# Patient Record
Sex: FEMALE | NOT HISPANIC OR LATINO | Employment: UNEMPLOYED | URBAN - METROPOLITAN AREA
[De-identification: names, ages, dates, MRNs, and addresses within clinical notes are randomized per-mention and may not be internally consistent; named-entity substitution may affect disease eponyms.]

---

## 2018-05-21 ENCOUNTER — HOSPITAL ENCOUNTER (INPATIENT)
Facility: MEDICAL CENTER | Age: 43
LOS: 2 days | DRG: 392 | End: 2018-05-23
Attending: EMERGENCY MEDICINE | Admitting: INTERNAL MEDICINE
Payer: MEDICAID

## 2018-05-21 DIAGNOSIS — A09 INFECTIOUS COLITIS: ICD-10-CM

## 2018-05-21 PROBLEM — E87.29 HIGH ANION GAP METABOLIC ACIDOSIS: Status: ACTIVE | Noted: 2018-05-21

## 2018-05-21 PROBLEM — A41.9 SEPSIS (HCC): Status: ACTIVE | Noted: 2018-05-21

## 2018-05-21 PROBLEM — D72.829 LEUCOCYTOSIS: Status: ACTIVE | Noted: 2018-05-21

## 2018-05-21 PROBLEM — R10.9 AP (ABDOMINAL PAIN): Status: ACTIVE | Noted: 2018-05-21

## 2018-05-21 LAB
ALBUMIN SERPL BCP-MCNC: 4 G/DL (ref 3.2–4.9)
ALBUMIN/GLOB SERPL: 1.2 G/DL
ALP SERPL-CCNC: 159 U/L (ref 30–99)
ALT SERPL-CCNC: 33 U/L (ref 2–50)
ANION GAP SERPL CALC-SCNC: 12 MMOL/L (ref 0–11.9)
APPEARANCE UR: CLEAR
AST SERPL-CCNC: 23 U/L (ref 12–45)
BASOPHILS # BLD AUTO: 0.2 % (ref 0–1.8)
BASOPHILS # BLD: 0.03 K/UL (ref 0–0.12)
BILIRUB SERPL-MCNC: 0.6 MG/DL (ref 0.1–1.5)
BILIRUB UR QL STRIP.AUTO: NEGATIVE
BUN SERPL-MCNC: 10 MG/DL (ref 8–22)
CALCIUM SERPL-MCNC: 9.2 MG/DL (ref 8.5–10.5)
CHLORIDE SERPL-SCNC: 108 MMOL/L (ref 96–112)
CO2 SERPL-SCNC: 19 MMOL/L (ref 20–33)
COLOR UR: NORMAL
CREAT SERPL-MCNC: 0.57 MG/DL (ref 0.5–1.4)
EOSINOPHIL # BLD AUTO: 0 K/UL (ref 0–0.51)
EOSINOPHIL NFR BLD: 0 % (ref 0–6.9)
ERYTHROCYTE [DISTWIDTH] IN BLOOD BY AUTOMATED COUNT: 41.3 FL (ref 35.9–50)
GLOBULIN SER CALC-MCNC: 3.3 G/DL (ref 1.9–3.5)
GLUCOSE SERPL-MCNC: 133 MG/DL (ref 65–99)
GLUCOSE UR STRIP.AUTO-MCNC: NEGATIVE MG/DL
HCG UR QL: NEGATIVE
HCT VFR BLD AUTO: 43.2 % (ref 37–47)
HGB BLD-MCNC: 14.4 G/DL (ref 12–16)
IMM GRANULOCYTES # BLD AUTO: 0.02 K/UL (ref 0–0.11)
IMM GRANULOCYTES NFR BLD AUTO: 0.2 % (ref 0–0.9)
KETONES UR STRIP.AUTO-MCNC: NEGATIVE MG/DL
LACTATE BLD-SCNC: 1.8 MMOL/L (ref 0.5–2)
LEUKOCYTE ESTERASE UR QL STRIP.AUTO: NEGATIVE
LIPASE SERPL-CCNC: 9 U/L (ref 11–82)
LYMPHOCYTES # BLD AUTO: 1.03 K/UL (ref 1–4.8)
LYMPHOCYTES NFR BLD: 8 % (ref 22–41)
MCH RBC QN AUTO: 29.9 PG (ref 27–33)
MCHC RBC AUTO-ENTMCNC: 33.3 G/DL (ref 33.6–35)
MCV RBC AUTO: 89.8 FL (ref 81.4–97.8)
MICRO URNS: NORMAL
MONOCYTES # BLD AUTO: 0.14 K/UL (ref 0–0.85)
MONOCYTES NFR BLD AUTO: 1.1 % (ref 0–13.4)
NEUTROPHILS # BLD AUTO: 11.59 K/UL (ref 2–7.15)
NEUTROPHILS NFR BLD: 90.5 % (ref 44–72)
NITRITE UR QL STRIP.AUTO: NEGATIVE
NRBC # BLD AUTO: 0 K/UL
NRBC BLD-RTO: 0 /100 WBC
PH UR STRIP.AUTO: 5 [PH]
PLATELET # BLD AUTO: 331 K/UL (ref 164–446)
PMV BLD AUTO: 10.4 FL (ref 9–12.9)
POTASSIUM SERPL-SCNC: 4 MMOL/L (ref 3.6–5.5)
PROT SERPL-MCNC: 7.3 G/DL (ref 6–8.2)
PROT UR QL STRIP: NEGATIVE MG/DL
RBC # BLD AUTO: 4.81 M/UL (ref 4.2–5.4)
RBC UR QL AUTO: NEGATIVE
SODIUM SERPL-SCNC: 139 MMOL/L (ref 135–145)
SP GR UR REFRACTOMETRY: >1.045
UROBILINOGEN UR STRIP.AUTO-MCNC: 0.2 MG/DL
WBC # BLD AUTO: 12.8 K/UL (ref 4.8–10.8)

## 2018-05-21 PROCEDURE — 700101 HCHG RX REV CODE 250: Performed by: INTERNAL MEDICINE

## 2018-05-21 PROCEDURE — 99285 EMERGENCY DEPT VISIT HI MDM: CPT

## 2018-05-21 PROCEDURE — 700102 HCHG RX REV CODE 250 W/ 637 OVERRIDE(OP): Performed by: FAMILY MEDICINE

## 2018-05-21 PROCEDURE — A9270 NON-COVERED ITEM OR SERVICE: HCPCS | Performed by: INTERNAL MEDICINE

## 2018-05-21 PROCEDURE — 700111 HCHG RX REV CODE 636 W/ 250 OVERRIDE (IP): Performed by: EMERGENCY MEDICINE

## 2018-05-21 PROCEDURE — 99223 1ST HOSP IP/OBS HIGH 75: CPT | Performed by: INTERNAL MEDICINE

## 2018-05-21 PROCEDURE — 85025 COMPLETE CBC W/AUTO DIFF WBC: CPT

## 2018-05-21 PROCEDURE — 81003 URINALYSIS AUTO W/O SCOPE: CPT

## 2018-05-21 PROCEDURE — 83605 ASSAY OF LACTIC ACID: CPT

## 2018-05-21 PROCEDURE — 83690 ASSAY OF LIPASE: CPT

## 2018-05-21 PROCEDURE — 87040 BLOOD CULTURE FOR BACTERIA: CPT | Mod: 91

## 2018-05-21 PROCEDURE — 700105 HCHG RX REV CODE 258: Performed by: INTERNAL MEDICINE

## 2018-05-21 PROCEDURE — 770006 HCHG ROOM/CARE - MED/SURG/GYN SEMI*

## 2018-05-21 PROCEDURE — 700102 HCHG RX REV CODE 250 W/ 637 OVERRIDE(OP): Performed by: INTERNAL MEDICINE

## 2018-05-21 PROCEDURE — A9270 NON-COVERED ITEM OR SERVICE: HCPCS | Performed by: FAMILY MEDICINE

## 2018-05-21 PROCEDURE — 96365 THER/PROPH/DIAG IV INF INIT: CPT

## 2018-05-21 PROCEDURE — A9270 NON-COVERED ITEM OR SERVICE: HCPCS | Performed by: EMERGENCY MEDICINE

## 2018-05-21 PROCEDURE — 96375 TX/PRO/DX INJ NEW DRUG ADDON: CPT

## 2018-05-21 PROCEDURE — 700102 HCHG RX REV CODE 250 W/ 637 OVERRIDE(OP): Performed by: EMERGENCY MEDICINE

## 2018-05-21 PROCEDURE — 81025 URINE PREGNANCY TEST: CPT

## 2018-05-21 PROCEDURE — 80053 COMPREHEN METABOLIC PANEL: CPT

## 2018-05-21 RX ORDER — SODIUM CHLORIDE 9 MG/ML
500 INJECTION, SOLUTION INTRAVENOUS
Status: DISCONTINUED | OUTPATIENT
Start: 2018-05-21 | End: 2018-05-23 | Stop reason: HOSPADM

## 2018-05-21 RX ORDER — SODIUM CHLORIDE 9 MG/ML
INJECTION, SOLUTION INTRAVENOUS CONTINUOUS
Status: DISCONTINUED | OUTPATIENT
Start: 2018-05-21 | End: 2018-05-23 | Stop reason: HOSPADM

## 2018-05-21 RX ORDER — OXYCODONE HYDROCHLORIDE 10 MG/1
20 TABLET ORAL EVERY 4 HOURS PRN
Status: DISCONTINUED | OUTPATIENT
Start: 2018-05-21 | End: 2018-05-23 | Stop reason: HOSPADM

## 2018-05-21 RX ORDER — HYDROCODONE BITARTRATE AND ACETAMINOPHEN 5; 325 MG/1; MG/1
1 TABLET ORAL EVERY 8 HOURS PRN
Status: DISCONTINUED | OUTPATIENT
Start: 2018-05-21 | End: 2018-05-21

## 2018-05-21 RX ORDER — ALPRAZOLAM 0.5 MG/1
0.5 TABLET ORAL NIGHTLY PRN
Status: DISCONTINUED | OUTPATIENT
Start: 2018-05-21 | End: 2018-05-23 | Stop reason: HOSPADM

## 2018-05-21 RX ORDER — OXYCODONE HYDROCHLORIDE 5 MG/1
10 TABLET ORAL
Status: ON HOLD | COMMUNITY
End: 2018-05-21

## 2018-05-21 RX ORDER — BISACODYL 10 MG
10 SUPPOSITORY, RECTAL RECTAL
Status: DISCONTINUED | OUTPATIENT
Start: 2018-05-21 | End: 2018-05-23 | Stop reason: HOSPADM

## 2018-05-21 RX ORDER — DIPHENHYDRAMINE HCL 25 MG
25 TABLET ORAL EVERY 6 HOURS PRN
Status: DISCONTINUED | OUTPATIENT
Start: 2018-05-21 | End: 2018-05-23 | Stop reason: HOSPADM

## 2018-05-21 RX ORDER — OXYCODONE HYDROCHLORIDE 10 MG/1
10 TABLET ORAL EVERY 4 HOURS PRN
Status: DISCONTINUED | OUTPATIENT
Start: 2018-05-21 | End: 2018-05-21

## 2018-05-21 RX ORDER — SODIUM CHLORIDE 9 MG/ML
30 INJECTION, SOLUTION INTRAVENOUS
Status: DISCONTINUED | OUTPATIENT
Start: 2018-05-21 | End: 2018-05-23 | Stop reason: HOSPADM

## 2018-05-21 RX ORDER — AMOXICILLIN 250 MG
2 CAPSULE ORAL 2 TIMES DAILY
Status: DISCONTINUED | OUTPATIENT
Start: 2018-05-21 | End: 2018-05-23 | Stop reason: HOSPADM

## 2018-05-21 RX ORDER — MORPHINE SULFATE 4 MG/ML
4 INJECTION, SOLUTION INTRAMUSCULAR; INTRAVENOUS EVERY 4 HOURS PRN
Status: DISCONTINUED | OUTPATIENT
Start: 2018-05-21 | End: 2018-05-23 | Stop reason: HOSPADM

## 2018-05-21 RX ORDER — OXYCODONE HCL 20 MG/1
20 TABLET, FILM COATED, EXTENDED RELEASE ORAL EVERY 4 HOURS PRN
COMMUNITY

## 2018-05-21 RX ORDER — ACETAMINOPHEN 325 MG/1
650 TABLET ORAL EVERY 6 HOURS PRN
Status: DISCONTINUED | OUTPATIENT
Start: 2018-05-21 | End: 2018-05-23 | Stop reason: HOSPADM

## 2018-05-21 RX ORDER — CEFTRIAXONE 1 G/1
1 INJECTION, POWDER, FOR SOLUTION INTRAMUSCULAR; INTRAVENOUS ONCE
Status: COMPLETED | OUTPATIENT
Start: 2018-05-21 | End: 2018-05-21

## 2018-05-21 RX ORDER — POLYETHYLENE GLYCOL 3350 17 G/17G
1 POWDER, FOR SOLUTION ORAL
Status: DISCONTINUED | OUTPATIENT
Start: 2018-05-21 | End: 2018-05-23 | Stop reason: HOSPADM

## 2018-05-21 RX ADMIN — CEFTRIAXONE SODIUM 1 G: 1 INJECTION, POWDER, FOR SOLUTION INTRAMUSCULAR; INTRAVENOUS at 17:01

## 2018-05-21 RX ADMIN — HYDROCODONE BITARTRATE AND ACETAMINOPHEN 1 TABLET: 5; 325 TABLET ORAL at 19:37

## 2018-05-21 RX ADMIN — METRONIDAZOLE 500 MG: 500 INJECTION, SOLUTION INTRAVENOUS at 18:47

## 2018-05-21 RX ADMIN — OXYCODONE HYDROCHLORIDE 10 MG: 5 TABLET ORAL at 21:17

## 2018-05-21 RX ADMIN — LIDOCAINE HYDROCHLORIDE 30 ML: 20 SOLUTION OROPHARYNGEAL at 16:11

## 2018-05-21 RX ADMIN — SODIUM CHLORIDE: 9 INJECTION, SOLUTION INTRAVENOUS at 19:45

## 2018-05-21 ASSESSMENT — PATIENT HEALTH QUESTIONNAIRE - PHQ9
SUM OF ALL RESPONSES TO PHQ9 QUESTIONS 1 AND 2: 0
1. LITTLE INTEREST OR PLEASURE IN DOING THINGS: NOT AT ALL
2. FEELING DOWN, DEPRESSED, IRRITABLE, OR HOPELESS: NOT AT ALL

## 2018-05-21 ASSESSMENT — PAIN SCALES - GENERAL: PAINLEVEL_OUTOF10: 6

## 2018-05-21 ASSESSMENT — ENCOUNTER SYMPTOMS
COUGH: 0
SPUTUM PRODUCTION: 0
ABDOMINAL PAIN: 1
BLURRED VISION: 0
EYE PAIN: 0
SEIZURES: 0
CHILLS: 0
FOCAL WEAKNESS: 0
INSOMNIA: 0
STRIDOR: 0
HEADACHES: 0
DIZZINESS: 0
EYE REDNESS: 0
NERVOUS/ANXIOUS: 0
FEVER: 0
MYALGIAS: 0
VOMITING: 1
WEIGHT LOSS: 0
DIARRHEA: 1
EYE DISCHARGE: 0
DEPRESSION: 0
HEARTBURN: 0
BACK PAIN: 0
PALPITATIONS: 0
SHORTNESS OF BREATH: 0
ORTHOPNEA: 0
NECK PAIN: 0
NAUSEA: 1

## 2018-05-21 ASSESSMENT — COPD QUESTIONNAIRES
IN THE PAST 12 MONTHS DO YOU DO LESS THAN YOU USED TO BECAUSE OF YOUR BREATHING PROBLEMS: DISAGREE/UNSURE
COPD SCREENING SCORE: 0
HAVE YOU SMOKED AT LEAST 100 CIGARETTES IN YOUR ENTIRE LIFE: NO/DON'T KNOW
DO YOU EVER COUGH UP ANY MUCUS OR PHLEGM?: NO/ONLY WITH OCCASIONAL COLDS OR INFECTIONS
DURING THE PAST 4 WEEKS HOW MUCH DID YOU FEEL SHORT OF BREATH: NONE/LITTLE OF THE TIME

## 2018-05-21 ASSESSMENT — LIFESTYLE VARIABLES
EVER_SMOKED: NEVER
ALCOHOL_USE: NO

## 2018-05-21 NOTE — ED PROVIDER NOTES
ED Provider Note    Scribed for Milan Agudelo D.O. by Edil Chakraborty. 5/21/2018  3:15 PM    Primary care provider: Divya Conte M.D.  Means of arrival: walk in  History obtained from: patient  History limited by: none    CHIEF COMPLAINT  Chief Complaint   Patient presents with   • Abdominal Pain   • Nausea/Vomiting/Diarrhea       HPI  Nel Queen is a 42 y.o. female who presents to the Emergency Department complaining of sudden epigastric abdominal pain for the last 11 hours. She describes her pain as 10/10 severity that woke her from sleep. Patient reports associated nausea, vomiting, diarrhea. She was evaluated at the ED in [place] and discharged after a CT scan that indicated inflammation in her epigastrum, but patient is unaware of what organ was affected. Patient states that her pain was improved with oxycodone which she is prescribed for chronic back pain, but states that her abdominal pain rcontinued intermittently through the morning, prompting her to come to the ED at [facility]. She reports a history of chronic back pain. Mother repots that her back pain usually coincides with the patient's menstrual period. Patient denies fever, dysuria, history of choloecystectomy     REVIEW OF SYSTEMS  Pertinent positives include abdominal pain, nausea, vomiting, diarrhea. Pertinent negatives include no fever, dysuria.  All other systems reviewed and negative.  C.    PAST MEDICAL HISTORY  Past Medical History:   Diagnosis Date   • Abdominal pain 7/17/2012   • GERD (gastroesophageal reflux disease) 7/17/2012   • Gestational diabetes mellitus        SURGICAL HISTORY  Past Surgical History:   Procedure Laterality Date   • PRIMARY C SECTION      x3        SOCIAL HISTORY  Social History   Substance Use Topics   • Smoking status: Never Smoker   • Smokeless tobacco: Never Used   • Alcohol use Yes      Comment: rare      History   Drug Use No       FAMILY HISTORY  Family History   Problem Relation Age of Onset   •  "Other Sister      Crohn's disease   • Diabetes Paternal Aunt        CURRENT MEDICATIONS  Current Outpatient Prescriptions:   •  alprazolam (XANAX) 0.5 MG Tab, Take 1 Tab by mouth at bedtime as needed for Sleep or Anxiety., Disp: 30 Tab, Rfl: 1  •  amoxicillin (AMOXIL) 875 MG tablet, Take 1 Tab by mouth 2 times a day., Disp: 20 Tab, Rfl: 0  •  Oxycodone-Acetaminophen (PERCOCET-10)  MG TABS, Take 1-2 Tabs by mouth every 12 hours as needed for Moderate Pain., Disp: 120 Tab, Rfl: 0  •  lorazepam (ATIVAN) 0.5 MG TABS, Take 1 Tab by mouth every 24 hours as needed for Anxiety., Disp: 30 Tab, Rfl: 1  •  meloxicam (MOBIC) 15 MG tablet, Take 1 Tab by mouth every day., Disp: 30 Tab, Rfl: 3  •  tramadol (ULTRAM) 50 MG TABS, Take 1-2 Tabs by mouth every 6 hours as needed for Mild Pain., Disp: 60 Tab, Rfl: 0  •  sucralfate (CARAFATE) 1 GM TABS, Take 1 Tab by mouth 3 times a day before meals., Disp: 30 Each, Rfl: 0  •  hydrocodone-acetaminophen (NORCO) 5-325 MG TABS per tablet, Take 1 Tab by mouth every 8 hours as needed., Disp: 20 Each, Rfl: 0  •  ciprofloxacin (CIPRO) 500 MG TABS, Take 1 Tab by mouth 2 times a day., Disp: 10 Tab, Rfl: 0  •  MILK THISTLE, by Does not apply route., Disp: , Rfl:   •  FISH OIL, by Does not apply route., Disp: , Rfl:   •  CHOLINE PO, Take  by mouth., Disp: , Rfl:     ALLERGIES  No Known Allergies    PHYSICAL EXAM  VITAL SIGNS: /67   Pulse 90   Temp 36.6 °C (97.8 °F)   Resp 16   Ht 1.549 m (5' 1\")   Wt 70.3 kg (155 lb)   SpO2 97%   BMI 29.29 kg/m²     Nursing notes and vitals reviewed.  Constitutional: Well developed, Well nourished, mild distress, Non-toxic appearance.   Eyes: PERRLA, EOMI, Conjunctiva normal, No discharge.   Cardiovascular: Normal heart rate, Normal rhythm, No murmurs, No rubs, No gallops.   Thorax & Lungs: No respiratory distress, No rales, No rhonchi, No wheezing, No chest tenderness.   Abdomen: Bowel sounds normal, Soft, Mild diffuse tenderness, No guarding, No " rebound, No masses, No pulsatile masses.   Skin: Warm, Dry, No erythema, No rash.   Musculoskeletal: Intact distal pulses, No edema, No cyanosis, No clubbing. Good range of motion in all major joints. No tenderness to palpation or major deformities noted, no CVA tenderness, no midline back tenderness.   Neurologic: Alert & oriented x 3, Normal motor function, Normal sensory function, No focal deficits noted.  Psychiatric: Affect normal for clinical presentation.    DIAGNOSTIC STUDIES/PROCEDURES    LABS  Results for orders placed or performed during the hospital encounter of 05/21/18   CBC WITH DIFFERENTIAL   Result Value Ref Range    WBC 12.8 (H) 4.8 - 10.8 K/uL    RBC 4.81 4.20 - 5.40 M/uL    Hemoglobin 14.4 12.0 - 16.0 g/dL    Hematocrit 43.2 37.0 - 47.0 %    MCV 89.8 81.4 - 97.8 fL    MCH 29.9 27.0 - 33.0 pg    MCHC 33.3 (L) 33.6 - 35.0 g/dL    RDW 41.3 35.9 - 50.0 fL    Platelet Count 331 164 - 446 K/uL    MPV 10.4 9.0 - 12.9 fL    Neutrophils-Polys 90.50 (H) 44.00 - 72.00 %    Lymphocytes 8.00 (L) 22.00 - 41.00 %    Monocytes 1.10 0.00 - 13.40 %    Eosinophils 0.00 0.00 - 6.90 %    Basophils 0.20 0.00 - 1.80 %    Immature Granulocytes 0.20 0.00 - 0.90 %    Nucleated RBC 0.00 /100 WBC    Neutrophils (Absolute) 11.59 (H) 2.00 - 7.15 K/uL    Lymphs (Absolute) 1.03 1.00 - 4.80 K/uL    Monos (Absolute) 0.14 0.00 - 0.85 K/uL    Eos (Absolute) 0.00 0.00 - 0.51 K/uL    Baso (Absolute) 0.03 0.00 - 0.12 K/uL    Immature Granulocytes (abs) 0.02 0.00 - 0.11 K/uL    NRBC (Absolute) 0.00 K/uL   COMP METABOLIC PANEL   Result Value Ref Range    Sodium 139 135 - 145 mmol/L    Potassium 4.0 3.6 - 5.5 mmol/L    Chloride 108 96 - 112 mmol/L    Co2 19 (L) 20 - 33 mmol/L    Anion Gap 12.0 (H) 0.0 - 11.9    Glucose 133 (H) 65 - 99 mg/dL    Bun 10 8 - 22 mg/dL    Creatinine 0.57 0.50 - 1.40 mg/dL    Calcium 9.2 8.5 - 10.5 mg/dL    AST(SGOT) 23 12 - 45 U/L    ALT(SGPT) 33 2 - 50 U/L    Alkaline Phosphatase 159 (H) 30 - 99 U/L    Total  Bilirubin 0.6 0.1 - 1.5 mg/dL    Albumin 4.0 3.2 - 4.9 g/dL    Total Protein 7.3 6.0 - 8.2 g/dL    Globulin 3.3 1.9 - 3.5 g/dL    A-G Ratio 1.2 g/dL   LIPASE   Result Value Ref Range    Lipase 9 (L) 11 - 82 U/L   URINALYSIS   Result Value Ref Range    Micro Urine Req see below     Color DK Yellow     Character Clear     Ph 5.0 5.0 - 8.0    Glucose Negative Negative mg/dL    Ketones Negative Negative mg/dL    Protein Negative Negative mg/dL    Bilirubin Negative Negative    Urobilinogen, Urine 0.2 Negative    Nitrite Negative Negative    Leukocyte Esterase Negative Negative    Occult Blood Negative Negative   BETA-HCG QUALITATIVE URINE   Result Value Ref Range    Beta-Hcg Urine Negative Negative   REFRACTOMETER SG   Result Value Ref Range    Specific Gravity >1.045    ESTIMATED GFR   Result Value Ref Range    GFR If African American >60 >60 mL/min/1.73 m 2    GFR If Non African American >60 >60 mL/min/1.73 m 2   All labs reviewed by me.    COURSE & MEDICAL DECISION MAKING  Pertinent Labs & Imaging studies reviewed. (See chart for details)    3:15 PM - Patient seen and examined at bedside. Patient will be treated with GI cocktail 30 ml. Ordered CBC with differential, CMP, Lipase, POC urinalysis, POC urine pregnancy to evaluate her symptoms.     4:43 PM - Patient reevaluated at bedside. Discussed admission to hospital. Patient and her mother verbalize understanding and agreement to this plan of care.     This is a charming 42 y.o. female that presents with infectious colitis. I did review the patient's CT scan from [facility] facility is completely early this morning did reveal evidence of colon edema inflammation. The patient has a leukocytosis, this was a abdominal tenderness. This reason, the patient will be admitted to Drs. for further evaluation and management. She has a nonsurgical abdomen currently. She is not pregnant excluding ectopic pregnancy, as well as urinary tract infection, notes of ischemic bowel,  cholecystitis, appendicitis, diverticulitis on examination.    DISPOSITION:  Patient will be admitted to hospital in guarded condition.    FINAL IMPRESSION  1. Infectious colitis          IEdil (Scribe), am scribing for, and in the presence of, Milan Agudelo D.O    Electronically signed by: Edil Chakraborty (Scribe), 5/21/2018    IMilan D.O. personally performed the services described in this documentation, as scribed by Edil Chakraborty in my presence, and it is both accurate and complete.    The note accurately reflects work and decisions made by me.  Milan Agudelo  5/21/2018  9:05 PM

## 2018-05-21 NOTE — ED TRIAGE NOTES
Pt to triage room via wheelchair with mom.    C/O acute abdomial pain with nausea vomiting and diarrhea which started at 0430 this AM. Pt states this have never happened before. Denies urinary symptoms.    Pt and family understand triage process and returned to the waiting room. Encouraged to inform staff of any changes in symptoms

## 2018-05-22 LAB
ANION GAP SERPL CALC-SCNC: 8 MMOL/L (ref 0–11.9)
BUN SERPL-MCNC: 11 MG/DL (ref 8–22)
CALCIUM SERPL-MCNC: 8.8 MG/DL (ref 8.5–10.5)
CHLORIDE SERPL-SCNC: 108 MMOL/L (ref 96–112)
CO2 SERPL-SCNC: 23 MMOL/L (ref 20–33)
CREAT SERPL-MCNC: 0.6 MG/DL (ref 0.5–1.4)
ERYTHROCYTE [DISTWIDTH] IN BLOOD BY AUTOMATED COUNT: 40.6 FL (ref 35.9–50)
GLUCOSE SERPL-MCNC: 107 MG/DL (ref 65–99)
HCT VFR BLD AUTO: 36.4 % (ref 37–47)
HGB BLD-MCNC: 12.3 G/DL (ref 12–16)
MCH RBC QN AUTO: 30.1 PG (ref 27–33)
MCHC RBC AUTO-ENTMCNC: 33.8 G/DL (ref 33.6–35)
MCV RBC AUTO: 89.2 FL (ref 81.4–97.8)
PLATELET # BLD AUTO: 290 K/UL (ref 164–446)
PMV BLD AUTO: 9.9 FL (ref 9–12.9)
POTASSIUM SERPL-SCNC: 3.6 MMOL/L (ref 3.6–5.5)
RBC # BLD AUTO: 4.08 M/UL (ref 4.2–5.4)
SODIUM SERPL-SCNC: 139 MMOL/L (ref 135–145)
WBC # BLD AUTO: 9 K/UL (ref 4.8–10.8)

## 2018-05-22 PROCEDURE — 700102 HCHG RX REV CODE 250 W/ 637 OVERRIDE(OP): Performed by: FAMILY MEDICINE

## 2018-05-22 PROCEDURE — 99232 SBSQ HOSP IP/OBS MODERATE 35: CPT | Performed by: FAMILY MEDICINE

## 2018-05-22 PROCEDURE — 770006 HCHG ROOM/CARE - MED/SURG/GYN SEMI*

## 2018-05-22 PROCEDURE — 700101 HCHG RX REV CODE 250: Performed by: INTERNAL MEDICINE

## 2018-05-22 PROCEDURE — A9270 NON-COVERED ITEM OR SERVICE: HCPCS | Performed by: INTERNAL MEDICINE

## 2018-05-22 PROCEDURE — 85027 COMPLETE CBC AUTOMATED: CPT

## 2018-05-22 PROCEDURE — A9270 NON-COVERED ITEM OR SERVICE: HCPCS | Performed by: FAMILY MEDICINE

## 2018-05-22 PROCEDURE — 80048 BASIC METABOLIC PNL TOTAL CA: CPT

## 2018-05-22 PROCEDURE — 36415 COLL VENOUS BLD VENIPUNCTURE: CPT

## 2018-05-22 PROCEDURE — 700102 HCHG RX REV CODE 250 W/ 637 OVERRIDE(OP): Performed by: INTERNAL MEDICINE

## 2018-05-22 PROCEDURE — 700111 HCHG RX REV CODE 636 W/ 250 OVERRIDE (IP): Performed by: INTERNAL MEDICINE

## 2018-05-22 PROCEDURE — 700105 HCHG RX REV CODE 258: Performed by: INTERNAL MEDICINE

## 2018-05-22 RX ADMIN — METRONIDAZOLE 500 MG: 500 INJECTION, SOLUTION INTRAVENOUS at 10:04

## 2018-05-22 RX ADMIN — ACETAMINOPHEN 650 MG: 325 TABLET, FILM COATED ORAL at 12:48

## 2018-05-22 RX ADMIN — OXYCODONE HYDROCHLORIDE 20 MG: 10 TABLET ORAL at 18:37

## 2018-05-22 RX ADMIN — SODIUM CHLORIDE 1000 ML: 9 INJECTION, SOLUTION INTRAVENOUS at 06:11

## 2018-05-22 RX ADMIN — ACETAMINOPHEN 650 MG: 325 TABLET, FILM COATED ORAL at 04:26

## 2018-05-22 RX ADMIN — ALPRAZOLAM 0.5 MG: 0.5 TABLET ORAL at 23:20

## 2018-05-22 RX ADMIN — METRONIDAZOLE 500 MG: 500 INJECTION, SOLUTION INTRAVENOUS at 17:46

## 2018-05-22 RX ADMIN — METRONIDAZOLE 500 MG: 500 INJECTION, SOLUTION INTRAVENOUS at 00:13

## 2018-05-22 RX ADMIN — CEFTRIAXONE SODIUM 1 G: 10 INJECTION, POWDER, FOR SOLUTION INTRAVENOUS at 09:00

## 2018-05-22 RX ADMIN — SODIUM CHLORIDE: 9 INJECTION, SOLUTION INTRAVENOUS at 17:49

## 2018-05-22 RX ADMIN — OXYCODONE HYDROCHLORIDE 20 MG: 10 TABLET ORAL at 06:09

## 2018-05-22 RX ADMIN — OXYCODONE HYDROCHLORIDE 20 MG: 10 TABLET ORAL at 00:12

## 2018-05-22 RX ADMIN — OXYCODONE HYDROCHLORIDE 20 MG: 10 TABLET ORAL at 14:27

## 2018-05-22 ASSESSMENT — ENCOUNTER SYMPTOMS
BLURRED VISION: 0
PHOTOPHOBIA: 0
DIZZINESS: 0
CHILLS: 0
WEIGHT LOSS: 0
SPUTUM PRODUCTION: 0
COUGH: 0
TINGLING: 0
ORTHOPNEA: 0
FEVER: 0
HEADACHES: 0
NECK PAIN: 0
DOUBLE VISION: 0
ABDOMINAL PAIN: 1
HEARTBURN: 0
PALPITATIONS: 0
NAUSEA: 0
HEMOPTYSIS: 0
BACK PAIN: 0
MYALGIAS: 0

## 2018-05-22 ASSESSMENT — PAIN SCALES - GENERAL
PAINLEVEL_OUTOF10: 6
PAINLEVEL_OUTOF10: 3
PAINLEVEL_OUTOF10: 3
PAINLEVEL_OUTOF10: 2
PAINLEVEL_OUTOF10: 6
PAINLEVEL_OUTOF10: 5
PAINLEVEL_OUTOF10: 3
PAINLEVEL_OUTOF10: 6

## 2018-05-22 NOTE — ED NOTES
Patient complains of sinus pressure. Asking for benadryl. No relief from norco. Call to Dr. Christianson for medications.     Pt reassured that room on S5 will be very clean and will be safe for her to be in. She is worried about contamination from previous pt.

## 2018-05-22 NOTE — ED NOTES
Medicated for pain.   Updated on delay for room  Educated on Lovenox, including risk related to PE up to and including death. Pt declined at this time.

## 2018-05-22 NOTE — CARE PLAN
Problem: Safety  Goal: Will remain free from injury  Outcome: PROGRESSING AS EXPECTED  Patient educated on call light system, and need to call for assist if any changes noted or needs. Safe way to rise.     Problem: Pain Management  Goal: Pain level will decrease to patient's comfort goal  Patient educated on pharmacological and non-pharmacological pain interventions to manage pain levels.

## 2018-05-22 NOTE — ED NOTES
Patient is reporting abdominal pain is not controlled with norco. Asking for additional medications.

## 2018-05-22 NOTE — H&P
Hospital Medicine History and Physical      Date of Service  5/21/2018    Chief Complaint  Chief Complaint   Patient presents with   • Abdominal Pain   • Nausea/Vomiting/Diarrhea       History of Presenting Illness  Bret is a 42 y.o. female, who presents with above. She said she woke up with diarrhea around 4am today. Associated with abdominal pain and nausea. She didn't have anything for dinner last night. She ate yogurt for lunch. She went to [place] where she had CT scan done and showed mild colitis then she was discharged from ER. She came to [facility] ER again today since she is not getting better. She was found to have sepsis and started on IV abx.     Primary Care Physician  Pcp Pt States None      Code Status  Full code    Review of Systems  Review of Systems   Constitutional: Negative for chills, fever and weight loss.   HENT: Negative for congestion and nosebleeds.    Eyes: Negative for blurred vision, pain, discharge and redness.   Respiratory: Negative for cough, sputum production, shortness of breath and stridor.    Cardiovascular: Negative for chest pain, palpitations and orthopnea.   Gastrointestinal: Positive for abdominal pain, diarrhea, nausea and vomiting. Negative for heartburn.   Genitourinary: Negative for dysuria, frequency and urgency.   Musculoskeletal: Negative for back pain, myalgias and neck pain.   Skin: Negative for itching and rash.   Neurological: Negative for dizziness, focal weakness, seizures and headaches.   Psychiatric/Behavioral: Negative for depression. The patient is not nervous/anxious and does not have insomnia.      Please see HPI, all other systems were reviewed and are negative (AMA/CMS criteria)     Past Medical History  Past Medical History:   Diagnosis Date   • Abdominal pain 7/17/2012   • GERD (gastroesophageal reflux disease) 7/17/2012   • Gestational diabetes mellitus        Surgical History  Past Surgical History:   Procedure Laterality Date   • PRIMARY C  SECTION      x3       Medications  No current facility-administered medications on file prior to encounter.      Current Outpatient Prescriptions on File Prior to Encounter   Medication Sig Dispense Refill   • alprazolam (XANAX) 0.5 MG Tab Take 1 Tab by mouth at bedtime as needed for Sleep or Anxiety. 30 Tab 1   • amoxicillin (AMOXIL) 875 MG tablet Take 1 Tab by mouth 2 times a day. 20 Tab 0   • Oxycodone-Acetaminophen (PERCOCET-10)  MG TABS Take 1-2 Tabs by mouth every 12 hours as needed for Moderate Pain. 120 Tab 0   • lorazepam (ATIVAN) 0.5 MG TABS Take 1 Tab by mouth every 24 hours as needed for Anxiety. 30 Tab 1   • meloxicam (MOBIC) 15 MG tablet Take 1 Tab by mouth every day. 30 Tab 3   • tramadol (ULTRAM) 50 MG TABS Take 1-2 Tabs by mouth every 6 hours as needed for Mild Pain. 60 Tab 0   • sucralfate (CARAFATE) 1 GM TABS Take 1 Tab by mouth 3 times a day before meals. 30 Each 0   • hydrocodone-acetaminophen (NORCO) 5-325 MG TABS per tablet Take 1 Tab by mouth every 8 hours as needed. 20 Each 0   • ciprofloxacin (CIPRO) 500 MG TABS Take 1 Tab by mouth 2 times a day. 10 Tab 0   • MILK THISTLE by Does not apply route.     • FISH OIL by Does not apply route.     • CHOLINE PO Take  by mouth.       Family History  Family History   Problem Relation Age of Onset   • Other Sister      Crohn's disease   • Diabetes Paternal Aunt          Social History  Social History   Substance Use Topics   • Smoking status: Never Smoker   • Smokeless tobacco: Never Used   • Alcohol use Yes      Comment: rare       Allergies  No Known Allergies     Physical Exam  Laboratory   Hemodynamics  Temp (24hrs), Av.6 °C (97.8 °F), Min:36.6 °C (97.8 °F), Max:36.6 °C (97.8 °F)   Temperature: 36.6 °C (97.8 °F)  Pulse  Av.5  Min: 81  Max: 90    Blood Pressure: 105/71      Respiratory      Respiration: 18, Pulse Oximetry: 97 %             Physical Exam   Constitutional: She is oriented to person, place, and time. No distress.   HENT:    Head: Normocephalic and atraumatic.   Mouth/Throat: Oropharynx is clear and moist.   Eyes: Conjunctivae and EOM are normal. Pupils are equal, round, and reactive to light.   Neck: Normal range of motion. Neck supple. No tracheal deviation present. No thyromegaly present.   Cardiovascular: Normal rate and regular rhythm.    No murmur heard.  Pulmonary/Chest: Effort normal and breath sounds normal. No respiratory distress. She has no wheezes.   Abdominal: Soft. Bowel sounds are normal. She exhibits no distension. There is tenderness. There is no rebound.   Musculoskeletal: She exhibits no edema or tenderness.   Neurological: She is alert and oriented to person, place, and time. No cranial nerve deficit.   Skin: Skin is warm and dry. She is not diaphoretic. No erythema.   Psychiatric: She has a normal mood and affect. Her behavior is normal. Thought content normal.       Recent Labs      05/21/18   1554   WBC  12.8*   RBC  4.81   HEMOGLOBIN  14.4   HEMATOCRIT  43.2   MCV  89.8   MCH  29.9   MCHC  33.3*   RDW  41.3   PLATELETCT  331   MPV  10.4     Recent Labs      05/21/18   1554   SODIUM  139   POTASSIUM  4.0   CHLORIDE  108   CO2  19*   GLUCOSE  133*   BUN  10   CREATININE  0.57   CALCIUM  9.2     Recent Labs      05/21/18   1554   ALTSGPT  33   ASTSGOT  23   ALKPHOSPHAT  159*   TBILIRUBIN  0.6   LIPASE  9*   GLUCOSE  133*                 No results found for: TROPONINI    Imaging  No orders to display     Ct abd: mild colitis: otherwise normal     Assessment/Plan     I anticipate this patient is appropriate for observation status at this time.    High anion gap metabolic acidosis- (present on admission)   Assessment & Plan    From dehydration  On IVF  Follow cmp        AP (abdominal pain)- (present on admission)   Assessment & Plan    related to colitis  On abx as above          Leucocytosis- (present on admission)   Assessment & Plan    From above        Sepsis (HCC)- (present on admission)   Assessment & Plan     This is sepsis (without associated acute organ dysfunction).   Sepsis protocol  On IV ceftriaxone and metronidazole  Follow culture            Prophylaxis:  lovenox

## 2018-05-22 NOTE — PROGRESS NOTES
Patient skin assessed by two RNs on admission. No adverse skin issues noted. Skin intact (no breakdowns noted), pink with good turgor.

## 2018-05-22 NOTE — ASSESSMENT & PLAN NOTE
This is sepsis (without associated acute organ dysfunction).   Sepsis protocol  On IV ceftriaxone and metronidazole  2nd to colitis  resolving

## 2018-05-22 NOTE — PROGRESS NOTES
"Received report from ER nurse. Assumed care of pt. @ 2135, when patient arrived on unit from ER. Initial assessment completed. Patient in bed, A/O x 4. Ambulated to bed. No acute distress. Patient states ongoing chronic back pain and acute abdominal pain. Reviewed care plan w/patient. Educated on unit norms, medications to be administered, patient safety and use of call light system. Questions answered. Monitoring ongoing. Call lights w/in reach. Bed locked in lowest position. Mother in room with patient. /59   Pulse 80   Temp 36.1 °C (97 °F)   Resp 17   Ht 1.549 m (5' 1\")   Wt 70.3 kg (155 lb)   LMP 05/07/2018   SpO2 98%   Breastfeeding? No   BMI 29.29 kg/m²   "

## 2018-05-22 NOTE — PROGRESS NOTES
[facility]    Date of Service: 2018    Chief Complaint  42 y.o. female admitted 2018 with abd pain and colitis    Interval Problem Update  none    Consultants/Specialty  none    Disposition  pending        Review of Systems   Constitutional: Negative for chills, fever and weight loss.   HENT: Negative for ear pain, hearing loss and tinnitus.    Eyes: Negative for blurred vision, double vision and photophobia.   Respiratory: Negative for cough, hemoptysis and sputum production.    Cardiovascular: Negative for chest pain, palpitations and orthopnea.   Gastrointestinal: Positive for abdominal pain. Negative for heartburn and nausea.   Genitourinary: Negative for dysuria, frequency and urgency.   Musculoskeletal: Negative for back pain, myalgias and neck pain.   Skin: Negative for itching and rash.   Neurological: Negative for dizziness, tingling and headaches.      Physical Exam  Laboratory/Imaging   Hemodynamics  Temp (24hrs), Av.3 °C (97.3 °F), Min:36.1 °C (97 °F), Max:36.6 °C (97.8 °F)   Temperature: 36.2 °C (97.1 °F)  Pulse  Av.6  Min: 66  Max: 90    Blood Pressure: 109/55, NIBP: 108/44      Respiratory      Respiration: 12, Pulse Oximetry: 96 %             Fluids  No intake or output data in the 24 hours ending 18 1036    Nutrition  Orders Placed This Encounter   Procedures   • Diet Order     Standing Status:   Standing     Number of Occurrences:   1     Order Specific Question:   Diet:     Answer:   Low Fiber(GI Soft) [2]     Physical Exam   Constitutional: She is oriented to person, place, and time. She appears well-developed and well-nourished.   HENT:   Head: Normocephalic and atraumatic.   Eyes: Conjunctivae are normal. Pupils are equal, round, and reactive to light.   Neck: Normal range of motion. Neck supple.   Cardiovascular: Normal rate and regular rhythm.    Pulmonary/Chest: Effort normal and breath sounds normal.   Abdominal: Soft. Bowel sounds are normal.  She exhibits no distension. There is tenderness (mild and diffuse). There is no rebound.   Musculoskeletal: She exhibits no edema or tenderness.   Neurological: She is alert and oriented to person, place, and time.   Skin: Skin is warm and dry.       Recent Labs      05/21/18   1554  05/22/18   0109   WBC  12.8*  9.0   RBC  4.81  4.08*   HEMOGLOBIN  14.4  12.3   HEMATOCRIT  43.2  36.4*   MCV  89.8  89.2   MCH  29.9  30.1   MCHC  33.3*  33.8   RDW  41.3  40.6   PLATELETCT  331  290   MPV  10.4  9.9     Recent Labs      05/21/18   1554  05/22/18   0109   SODIUM  139  139   POTASSIUM  4.0  3.6   CHLORIDE  108  108   CO2  19*  23   GLUCOSE  133*  107*   BUN  10  11   CREATININE  0.57  0.60   CALCIUM  9.2  8.8                      Assessment/Plan     High anion gap metabolic acidosis- (present on admission)   Assessment & Plan    s/p iv fluid  Has resolved        AP (abdominal pain)- (present on admission)   Assessment & Plan    related to colitis as per ct from outside facility  Better  Rocephin  flagyl          Leucocytosis- (present on admission)   Assessment & Plan    From above        Sepsis (HCC)- (present on admission)   Assessment & Plan    This is sepsis (without associated acute organ dysfunction).   Sepsis protocol  On IV ceftriaxone and metronidazole  2nd to colitis  resolving          Quality-Core Measures   Tillman catheter::  No Tillman  DVT prophylaxis pharmacological::  Enoxaparin (Lovenox)

## 2018-05-22 NOTE — PROGRESS NOTES
Bedside report received. Assumed care of pt.  Pt able to make needs known.  Pt shows no s/s of distress.  Resting comfortably in bed.   Fall precautions in place, call light and belongings within reach.  Hourly rounding in place.  Pt reports improvement in symptoms this morning, minimal pain.  Tolerating diet, but afraid to overdue it due to pain.  Some anxiety noted.

## 2018-05-22 NOTE — DISCHARGE PLANNING
note:  Rounding done.   Pt said she lives with her parents. Family will pick her up upon discharge. Has no dc concerns.     Discussed with IDT :  MD indicated possible discharge tomorrow.

## 2018-05-22 NOTE — DOCUMENTATION QUERY
DOCUMENTATION QUERY    PROVIDERS: Please select “Cosign w/ note” to reply to query.    To better represent the severity of illness of your patient, please review the following information and exercise your independent professional judgment in responding to this query.     Sepsis is documented in the History and Physical and Hospitalist Progress Note. The lab results/diagnostic findings/or treatments do not support the diagnosis of sepsis. Can you please provide further diagnostic findings that supports this diagnosis of sepsis?    • Sepsis is a valid diagnosis for this admission. (please provide additional relevant clinical indicators ___________________.)  • Sepsis is not a valid diagnosis for this admission.  • Other diagnosis explaining the findings __________________________________  • Unable to determine    The medical record reflects the following:   Clinical Findings 5/21 SIRS criteria 1/4 met:  WBC 12.8  Infection: colitis     Lactic Acid 1.8    H&P:   · She was found to have sepsis and started on IV abx  · Sepsis - this is sepsis  · She went to [place] where she had CT scan done and showed mild colitis    ED provider note: infectious colitis     Admitting vitals: HR 90; RR 16; T 97.8; ; SpO2 97% on RA   Treatment Sepsis protocol  IV ceftriaxone  IV metronidazole   Follow culture   Risk Factors Colitis    Location within medical record History and Physical, Progress Notes, Lab Results  and ED provider note     Thank you,   Minnie Shaffer, RN, BSN  Clinical   [phone]

## 2018-05-23 VITALS
RESPIRATION RATE: 20 BRPM | HEIGHT: 61 IN | HEART RATE: 77 BPM | SYSTOLIC BLOOD PRESSURE: 114 MMHG | WEIGHT: 192.9 LBS | OXYGEN SATURATION: 99 % | DIASTOLIC BLOOD PRESSURE: 58 MMHG | BODY MASS INDEX: 36.42 KG/M2 | TEMPERATURE: 98 F

## 2018-05-23 LAB
ALBUMIN SERPL BCP-MCNC: 3.1 G/DL (ref 3.2–4.9)
ALBUMIN/GLOB SERPL: 1.1 G/DL
ALP SERPL-CCNC: 109 U/L (ref 30–99)
ALT SERPL-CCNC: 20 U/L (ref 2–50)
ANION GAP SERPL CALC-SCNC: 5 MMOL/L (ref 0–11.9)
AST SERPL-CCNC: 16 U/L (ref 12–45)
BASOPHILS # BLD AUTO: 0.3 % (ref 0–1.8)
BASOPHILS # BLD: 0.02 K/UL (ref 0–0.12)
BILIRUB SERPL-MCNC: 0.3 MG/DL (ref 0.1–1.5)
BUN SERPL-MCNC: 8 MG/DL (ref 8–22)
CALCIUM SERPL-MCNC: 8.2 MG/DL (ref 8.5–10.5)
CHLORIDE SERPL-SCNC: 112 MMOL/L (ref 96–112)
CO2 SERPL-SCNC: 25 MMOL/L (ref 20–33)
CREAT SERPL-MCNC: 0.57 MG/DL (ref 0.5–1.4)
EOSINOPHIL # BLD AUTO: 0.25 K/UL (ref 0–0.51)
EOSINOPHIL NFR BLD: 4.2 % (ref 0–6.9)
ERYTHROCYTE [DISTWIDTH] IN BLOOD BY AUTOMATED COUNT: 43 FL (ref 35.9–50)
GLOBULIN SER CALC-MCNC: 2.7 G/DL (ref 1.9–3.5)
GLUCOSE SERPL-MCNC: 82 MG/DL (ref 65–99)
HCT VFR BLD AUTO: 33.4 % (ref 37–47)
HGB BLD-MCNC: 10.7 G/DL (ref 12–16)
IMM GRANULOCYTES # BLD AUTO: 0.01 K/UL (ref 0–0.11)
IMM GRANULOCYTES NFR BLD AUTO: 0.2 % (ref 0–0.9)
LYMPHOCYTES # BLD AUTO: 2.73 K/UL (ref 1–4.8)
LYMPHOCYTES NFR BLD: 46.3 % (ref 22–41)
MCH RBC QN AUTO: 29.8 PG (ref 27–33)
MCHC RBC AUTO-ENTMCNC: 32 G/DL (ref 33.6–35)
MCV RBC AUTO: 93 FL (ref 81.4–97.8)
MONOCYTES # BLD AUTO: 0.34 K/UL (ref 0–0.85)
MONOCYTES NFR BLD AUTO: 5.8 % (ref 0–13.4)
NEUTROPHILS # BLD AUTO: 2.54 K/UL (ref 2–7.15)
NEUTROPHILS NFR BLD: 43.2 % (ref 44–72)
NRBC # BLD AUTO: 0 K/UL
NRBC BLD-RTO: 0 /100 WBC
PLATELET # BLD AUTO: 217 K/UL (ref 164–446)
PMV BLD AUTO: 10.2 FL (ref 9–12.9)
POTASSIUM SERPL-SCNC: 3.4 MMOL/L (ref 3.6–5.5)
PROT SERPL-MCNC: 5.8 G/DL (ref 6–8.2)
RBC # BLD AUTO: 3.59 M/UL (ref 4.2–5.4)
SODIUM SERPL-SCNC: 142 MMOL/L (ref 135–145)
WBC # BLD AUTO: 5.9 K/UL (ref 4.8–10.8)

## 2018-05-23 PROCEDURE — 700105 HCHG RX REV CODE 258: Performed by: INTERNAL MEDICINE

## 2018-05-23 PROCEDURE — 80053 COMPREHEN METABOLIC PANEL: CPT

## 2018-05-23 PROCEDURE — 700111 HCHG RX REV CODE 636 W/ 250 OVERRIDE (IP): Performed by: INTERNAL MEDICINE

## 2018-05-23 PROCEDURE — A9270 NON-COVERED ITEM OR SERVICE: HCPCS | Performed by: FAMILY MEDICINE

## 2018-05-23 PROCEDURE — 36415 COLL VENOUS BLD VENIPUNCTURE: CPT

## 2018-05-23 PROCEDURE — 700102 HCHG RX REV CODE 250 W/ 637 OVERRIDE(OP): Performed by: FAMILY MEDICINE

## 2018-05-23 PROCEDURE — 700101 HCHG RX REV CODE 250: Performed by: INTERNAL MEDICINE

## 2018-05-23 PROCEDURE — 85025 COMPLETE CBC W/AUTO DIFF WBC: CPT

## 2018-05-23 RX ORDER — ONDANSETRON 4 MG/1
4 TABLET, FILM COATED ORAL EVERY 4 HOURS PRN
Qty: 15 TAB | Refills: 0 | Status: SHIPPED | OUTPATIENT
Start: 2018-05-23

## 2018-05-23 RX ORDER — CEFDINIR 300 MG/1
300 CAPSULE ORAL 2 TIMES DAILY
Qty: 10 CAP | Refills: 0 | Status: SHIPPED | OUTPATIENT
Start: 2018-05-23

## 2018-05-23 RX ORDER — METRONIDAZOLE 500 MG/1
500 TABLET ORAL 3 TIMES DAILY
Qty: 15 TAB | Refills: 0 | Status: SHIPPED | OUTPATIENT
Start: 2018-05-23

## 2018-05-23 RX ADMIN — SODIUM CHLORIDE: 9 INJECTION, SOLUTION INTRAVENOUS at 07:24

## 2018-05-23 RX ADMIN — OXYCODONE HYDROCHLORIDE 20 MG: 10 TABLET ORAL at 08:07

## 2018-05-23 RX ADMIN — CEFTRIAXONE SODIUM 1 G: 10 INJECTION, POWDER, FOR SOLUTION INTRAVENOUS at 08:13

## 2018-05-23 RX ADMIN — METRONIDAZOLE 500 MG: 500 INJECTION, SOLUTION INTRAVENOUS at 02:00

## 2018-05-23 RX ADMIN — OXYCODONE HYDROCHLORIDE 20 MG: 10 TABLET ORAL at 04:01

## 2018-05-23 ASSESSMENT — PAIN SCALES - GENERAL
PAINLEVEL_OUTOF10: 6
PAINLEVEL_OUTOF10: 4

## 2018-05-23 NOTE — CARE PLAN
Problem: Communication  Goal: The ability to communicate needs accurately and effectively will improve  Outcome: PROGRESSING AS EXPECTED  Patient able to communicate her needs to staff appropriately. Patient sleeping comfortably in bed at this time.    Problem: Safety  Goal: Will remain free from falls  Outcome: PROGRESSING AS EXPECTED  Patient up self; no assistance required. Tolerates ambulation well. Patient call light within reach, bed in low position and locked, personal possessions close by, patient rounding in practice, and non-skid socks in place. Patient NO RISK to fall per HD assessment tool. Patient remains free of injury since start of shift.

## 2018-05-23 NOTE — CARE PLAN
Problem: Bowel/Gastric:  Goal: Normal bowel function is maintained or improved  Outcome: PROGRESSING AS EXPECTED  Pt is experiencing diarrhea, requested imodium.  Pt educated imodium is contraindicated in colitis.  Pt verbalized understanding.     Problem: Pain Management  Goal: Pain level will decrease to patient's comfort goal  Outcome: PROGRESSING AS EXPECTED  Pt has oxy for chronic pain, morphine for breakthrough.  Pt had minimal pain earlier in the day, increasing in intensity this afternoon.  Pt provided with oxy as per MAR.

## 2018-05-23 NOTE — DOCUMENTATION QUERY
DOCUMENTATION QUERY     PROVIDERS: Please select “Cosign w/ note” to reply to query.     To better represent the severity of illness of your patient, please review the following information and exercise your independent professional judgment in responding to this query.      Sepsis is documented in the History and Physical, Hospitalist Progress Note and DC Summary. The lab results/diagnostic findings/or treatments do not support the diagnosis of sepsis. Can you please provide further diagnostic findings that supports this diagnosis of sepsis?     · Sepsis is a valid diagnosis for this admission. (please provide additional relevant clinical indicators ___________________.)  · Sepsis is not a valid diagnosis for this admission.  · Other diagnosis explaining the findings __________________________________  · Unable to determine     The medical record reflects the following:   Clinical Findings 5/21 SIRS criteria 1/4 met:  WBC 12.8  Infection: colitis      Lactic Acid 1.8     H&P:   · She was found to have sepsis and started on IV abx  · Sepsis - this is sepsis  · She went to [place] where she had CT scan done and showed mild colitis     ED provider note: infectious colitis      Admitting vitals: HR 90; RR 16; T 97.8; ; SpO2 97% on RA    DC Summary:   · Sepsis POA: Yes  · Her blood cultures are negative times 2   Treatment Sepsis protocol  IV ceftriaxone  IV metronidazole   Follow culture   Risk Factors Colitis    Location within medical record History and Physical, Progress Notes, Lab Results  and ED provider note      Thank you,   Minnie Shaffer RN, BSN  Clinical   [phone]

## 2018-05-23 NOTE — DISCHARGE INSTRUCTIONS
Discharge Instructions    Discharged to home by car with relative. Discharged via walking, hospital escort: Refused.  Special equipment needed: Not Applicable    Be sure to schedule a follow-up appointment with your primary care doctor or any specialists as instructed.     Discharge Plan:   Diet Plan: Discussed  Activity Level: Discussed  Confirmed Follow up Appointment: Appointment Scheduled  Confirmed Symptoms Management: Discussed  Medication Reconciliation Updated: Yes  Influenza Vaccine Indication: Patient Refuses    I understand that a diet low in cholesterol, fat, and sodium is recommended for good health. Unless I have been given specific instructions below for another diet, I accept this instruction as my diet prescription.   Other diet: GI soft    Special Instructions: None    · Is patient discharged on Warfarin / Coumadin?   No     Depression / Suicide Risk    As you are discharged from this [facility] facility, it is important to learn how to keep safe from harming yourself.    Recognize the warning signs:  · Abrupt changes in personality, positive or negative- including increase in energy   · Giving away possessions  · Change in eating patterns- significant weight changes-  positive or negative  · Change in sleeping patterns- unable to sleep or sleeping all the time   · Unwillingness or inability to communicate  · Depression  · Unusual sadness, discouragement and loneliness  · Talk of wanting to die  · Neglect of personal appearance   · Rebelliousness- reckless behavior  · Withdrawal from people/activities they love  · Confusion- inability to concentrate     If you or a loved one observes any of these behaviors or has concerns about self-harm, here's what you can do:  · Talk about it- your feelings and reasons for harming yourself  · Remove any means that you might use to hurt yourself (examples: pills, rope, extension cords, firearm)  · Get professional help from the community (Mental Health,  Substance Abuse, psychological counseling)  · Do not be alone:Call your Safe Contact- someone whom you trust who will be there for you.  · Call your local CRISIS HOTLINE 084-7892 or 958-994-3362  · Call your local Children's Mobile Crisis Response Team [place] (879) 743-9353 or www.[employer]  · Call the toll free National Suicide Prevention Hotlines   · National Suicide Prevention Lifeline 061-596-GXSB (5104)  · National Hope Line Network 800-SUICIDE (902-1300)

## 2018-05-23 NOTE — PROGRESS NOTES
Pt discharged to home. Pt received 3 prescriptions to home. Pt understood all discharge papers and signed hospital copy. RN took IV out. Pt understands the symptoms if she needs to go back to the ER or call her doctor. Pt refused a WC from transport and walked down to exit where her ride was waiting to take her home.

## 2018-05-23 NOTE — PROGRESS NOTES
Received bedside report from day-shift RN and assumed care of patient. Labs and orders noted. Assessment performed. Patient is alert and oriented x4 with no signs of labored breathing or distress. Patient denies any dizziness, chest pain, nausea, numbness, or tingling at this time. Patient updated on plan of care; verbalizes understanding and states all of her questions/concerns have been addressed and answered appropriately. Patient states all of her needs are being met at this time. Safety precautions in place including patient call light within reach, personal possessions nearby, bed in low position and locked, patient rounding in practice, and non-skid socks in place.

## 2018-05-23 NOTE — CARE PLAN
Problem: Safety  Goal: Will remain free from injury  Outcome: PROGRESSING AS EXPECTED  Pt safe and free from falls. Call light within reach. Hourly rounding completed    Problem: Infection  Goal: Will remain free from infection  Outcome: PROGRESSING AS EXPECTED  Hand and personal hygiene promoted. All precautions maintained in and out of room.

## 2018-05-23 NOTE — DISCHARGE SUMMARY
CHIEF COMPLAINT ON ADMISSION  Chief Complaint   Patient presents with   • Abdominal Pain   • Nausea/Vomiting/Diarrhea       CODE STATUS  Full Code    HPI & HOSPITAL COURSE  This is a 42 y.o. female here with   diarrhea around 4am today. Associated with abdominal pain and nausea. She didn't have anything for dinner last night. She ate yogurt for lunch. She went to [place] where she had CT scan done and showed mild colitis then she was discharged from ER. She came to [facility] ER again today since she is not getting better.she was admitted and started on antibiotics.she feels better and will discharge her home.her blood cultures are negatuive times 2.    The patient recovered much more quickly than anticipated on admission.    Therefore, she is discharged in good and stable condition with close outpatient follow-up.    SPECIFIC OUTPATIENT FOLLOW-UP  pcp in 1 week    DISCHARGE PROBLEM LIST  Active Problems:    Sepsis (HCC) POA: Yes    Leucocytosis POA: Yes    AP (abdominal pain) POA: Yes    High anion gap metabolic acidosis POA: Yes  Resolved Problems:    * No resolved hospital problems. *      FOLLOW UP  No future appointments.  No follow-up provider specified.    MEDICATIONS ON DISCHARGE   Nel Queen   Home Medication Instructions YANG:[id number]    Printed on:05/23/18 0902   Medication Information                      alprazolam (XANAX) 0.5 MG Tab  Take 1 Tab by mouth at bedtime as needed for Sleep or Anxiety.             cefdinir (OMNICEF) 300 MG Cap  Take 1 Cap by mouth 2 times a day.             meloxicam (MOBIC) 15 MG tablet  Take 1 Tab by mouth every day.             metroNIDAZOLE (FLAGYL) 500 MG Tab  Take 1 Tab by mouth 3 times a day.             oxyCODONE CR (OXYCONTIN) 20 MG Tablet Extended Release 12 hour Abuse-Deterrent  Take 20 mg by mouth every four hours as needed.             Oxycodone-Acetaminophen (PERCOCET-10)  MG TABS  Take 1-2 Tabs by mouth every 12 hours as needed for Moderate Pain.                  DIET  Orders Placed This Encounter   Procedures   • Diet Order     Standing Status:   Standing     Number of Occurrences:   1     Order Specific Question:   Diet:     Answer:   Low Fiber(GI Soft) [2]       ACTIVITY  As tolerated.  Weight bearing as tolerated      CONSULTATIONS  none    PROCEDURES  none    LABORATORY  Lab Results   Component Value Date/Time    SODIUM 142 05/23/2018 03:49 AM    POTASSIUM 3.4 (L) 05/23/2018 03:49 AM    CHLORIDE 112 05/23/2018 03:49 AM    CO2 25 05/23/2018 03:49 AM    GLUCOSE 82 05/23/2018 03:49 AM    BUN 8 05/23/2018 03:49 AM    CREATININE 0.57 05/23/2018 03:49 AM        Lab Results   Component Value Date/Time    WBC 5.9 05/23/2018 03:49 AM    HEMOGLOBIN 10.7 (L) 05/23/2018 03:49 AM    HEMATOCRIT 33.4 (L) 05/23/2018 03:49 AM    PLATELETCT 217 05/23/2018 03:49 AM        Total time of the discharge process exceeds 36 minutes

## 2018-05-26 LAB
BACTERIA BLD CULT: NORMAL
BACTERIA BLD CULT: NORMAL
SIGNIFICANT IND 70042: NORMAL
SIGNIFICANT IND 70042: NORMAL
SITE SITE: NORMAL
SITE SITE: NORMAL
SOURCE SOURCE: NORMAL
SOURCE SOURCE: NORMAL

## 2021-12-06 ENCOUNTER — NON-PROVIDER VISIT (OUTPATIENT)
Dept: URGENT CARE | Facility: PHYSICIAN GROUP | Age: 46
End: 2021-12-06

## 2021-12-06 DIAGNOSIS — Z00.8 ENCOUNTER FOR WORK CAPABILITY ASSESSMENT: ICD-10-CM

## 2021-12-06 DIAGNOSIS — Z02.1 PRE-EMPLOYMENT HEALTH SCREENING EXAMINATION: ICD-10-CM

## 2021-12-06 PROCEDURE — 86580 TB INTRADERMAL TEST: CPT | Performed by: PHYSICIAN ASSISTANT

## 2021-12-08 ENCOUNTER — NON-PROVIDER VISIT (OUTPATIENT)
Dept: URGENT CARE | Facility: PHYSICIAN GROUP | Age: 46
End: 2021-12-08

## 2021-12-08 LAB — TB WHEAL 3D P 5 TU DIAM: 3 MM

## 2023-11-16 ENCOUNTER — NON-PROVIDER VISIT (OUTPATIENT)
Dept: URGENT CARE | Facility: PHYSICIAN GROUP | Age: 48
End: 2023-11-16

## 2023-11-16 DIAGNOSIS — Z11.1 SCREENING-PULMONARY TB: ICD-10-CM

## 2023-11-16 PROCEDURE — 86580 TB INTRADERMAL TEST: CPT | Performed by: NURSE PRACTITIONER

## 2023-11-16 NOTE — PROGRESS NOTES
Nel Queen is a 47 y.o. female here for a non-provider visit for PPD placement -- Step 1 of 1    Reason for PPD:  work requirement    1. TB evaluation questionnaire completed by patient? Yes      -  If any answers marked yes did you contact a provider prior to placing? Not Indicated  2.  Patient notified to return to clinic for reading on: after 10:30am Saturday 11/18/23. Sunday we are closed.   3.  PPD Placement documentation completed on TB evaluation questionnaire? Yes  4.  Location of TB evaluation questionnaire filed: [facility]

## 2023-11-18 ENCOUNTER — NON-PROVIDER VISIT (OUTPATIENT)
Dept: URGENT CARE | Facility: PHYSICIAN GROUP | Age: 48
End: 2023-11-18

## 2023-11-18 LAB — TB WHEAL 3D P 5 TU DIAM: NEGATIVE MM

## 2023-11-18 NOTE — PROGRESS NOTES
Nel Queen is a 47 y.o. female here for a non-provider visit for PPD reading -- Step 1 of 1.      1.  Resulted in Epic under enter/edit results? Yes   2.  TB evaluation questionnaire scanned into chart and original given to patient?No      3. Was induration greater than 0 mm? No.    If Step 1 of 2, when is patient returning for second step (delete if N/A): NA    Routed to PCP? No

## 2024-01-02 ENCOUNTER — HOSPITAL ENCOUNTER (EMERGENCY)
Facility: MEDICAL CENTER | Age: 49
End: 2024-01-02
Attending: EMERGENCY MEDICINE
Payer: MEDICAID

## 2024-01-02 VITALS
RESPIRATION RATE: 16 BRPM | BODY MASS INDEX: 38.46 KG/M2 | TEMPERATURE: 97.8 F | DIASTOLIC BLOOD PRESSURE: 50 MMHG | HEIGHT: 61 IN | SYSTOLIC BLOOD PRESSURE: 101 MMHG | OXYGEN SATURATION: 95 % | HEART RATE: 66 BPM | WEIGHT: 203.71 LBS

## 2024-01-02 DIAGNOSIS — B34.9 VIRAL ILLNESS: ICD-10-CM

## 2024-01-02 LAB
ALBUMIN SERPL BCP-MCNC: 4.2 G/DL (ref 3.2–4.9)
ALBUMIN/GLOB SERPL: 0.9 G/DL
ALP SERPL-CCNC: 225 U/L (ref 30–99)
ALT SERPL-CCNC: 61 U/L (ref 2–50)
ANION GAP SERPL CALC-SCNC: 11 MMOL/L (ref 7–16)
AST SERPL-CCNC: 53 U/L (ref 12–45)
BASOPHILS # BLD AUTO: 0.2 % (ref 0–1.8)
BASOPHILS # BLD: 0.01 K/UL (ref 0–0.12)
BILIRUB SERPL-MCNC: 0.5 MG/DL (ref 0.1–1.5)
BUN SERPL-MCNC: 13 MG/DL (ref 8–22)
CALCIUM ALBUM COR SERPL-MCNC: 9 MG/DL (ref 8.5–10.5)
CALCIUM SERPL-MCNC: 9.2 MG/DL (ref 8.5–10.5)
CHLORIDE SERPL-SCNC: 99 MMOL/L (ref 96–112)
CO2 SERPL-SCNC: 27 MMOL/L (ref 20–33)
CREAT SERPL-MCNC: 0.65 MG/DL (ref 0.5–1.4)
EOSINOPHIL # BLD AUTO: 0 K/UL (ref 0–0.51)
EOSINOPHIL NFR BLD: 0 % (ref 0–6.9)
ERYTHROCYTE [DISTWIDTH] IN BLOOD BY AUTOMATED COUNT: 41.9 FL (ref 35.9–50)
GFR SERPLBLD CREATININE-BSD FMLA CKD-EPI: 110 ML/MIN/1.73 M 2
GLOBULIN SER CALC-MCNC: 4.6 G/DL (ref 1.9–3.5)
GLUCOSE SERPL-MCNC: 101 MG/DL (ref 65–99)
HCT VFR BLD AUTO: 45.6 % (ref 37–47)
HGB BLD-MCNC: 14.8 G/DL (ref 12–16)
IMM GRANULOCYTES # BLD AUTO: 0.02 K/UL (ref 0–0.11)
IMM GRANULOCYTES NFR BLD AUTO: 0.5 % (ref 0–0.9)
LYMPHOCYTES # BLD AUTO: 1.03 K/UL (ref 1–4.8)
LYMPHOCYTES NFR BLD: 24 % (ref 22–41)
MCH RBC QN AUTO: 29.4 PG (ref 27–33)
MCHC RBC AUTO-ENTMCNC: 32.5 G/DL (ref 32.2–35.5)
MCV RBC AUTO: 90.7 FL (ref 81.4–97.8)
MONOCYTES # BLD AUTO: 0.24 K/UL (ref 0–0.85)
MONOCYTES NFR BLD AUTO: 5.6 % (ref 0–13.4)
NEUTROPHILS # BLD AUTO: 3 K/UL (ref 1.82–7.42)
NEUTROPHILS NFR BLD: 69.7 % (ref 44–72)
NRBC # BLD AUTO: 0 K/UL
NRBC BLD-RTO: 0 /100 WBC (ref 0–0.2)
PLATELET # BLD AUTO: 235 K/UL (ref 164–446)
PMV BLD AUTO: 9.9 FL (ref 9–12.9)
POTASSIUM SERPL-SCNC: 4 MMOL/L (ref 3.6–5.5)
PROT SERPL-MCNC: 8.8 G/DL (ref 6–8.2)
RBC # BLD AUTO: 5.03 M/UL (ref 4.2–5.4)
SODIUM SERPL-SCNC: 137 MMOL/L (ref 135–145)
TSH SERPL DL<=0.005 MIU/L-ACNC: 1.29 UIU/ML (ref 0.38–5.33)
WBC # BLD AUTO: 4.3 K/UL (ref 4.8–10.8)

## 2024-01-02 PROCEDURE — 85025 COMPLETE CBC W/AUTO DIFF WBC: CPT

## 2024-01-02 PROCEDURE — 84443 ASSAY THYROID STIM HORMONE: CPT

## 2024-01-02 PROCEDURE — 96375 TX/PRO/DX INJ NEW DRUG ADDON: CPT

## 2024-01-02 PROCEDURE — A9270 NON-COVERED ITEM OR SERVICE: HCPCS | Mod: UD | Performed by: EMERGENCY MEDICINE

## 2024-01-02 PROCEDURE — 99284 EMERGENCY DEPT VISIT MOD MDM: CPT

## 2024-01-02 PROCEDURE — 700102 HCHG RX REV CODE 250 W/ 637 OVERRIDE(OP): Mod: UD | Performed by: EMERGENCY MEDICINE

## 2024-01-02 PROCEDURE — 96374 THER/PROPH/DIAG INJ IV PUSH: CPT

## 2024-01-02 PROCEDURE — 80053 COMPREHEN METABOLIC PANEL: CPT

## 2024-01-02 PROCEDURE — 700111 HCHG RX REV CODE 636 W/ 250 OVERRIDE (IP): Mod: UD

## 2024-01-02 PROCEDURE — 700111 HCHG RX REV CODE 636 W/ 250 OVERRIDE (IP): Mod: UD | Performed by: EMERGENCY MEDICINE

## 2024-01-02 PROCEDURE — 36415 COLL VENOUS BLD VENIPUNCTURE: CPT

## 2024-01-02 RX ORDER — DIPHENHYDRAMINE HYDROCHLORIDE 50 MG/ML
25 INJECTION INTRAMUSCULAR; INTRAVENOUS ONCE
Status: COMPLETED | OUTPATIENT
Start: 2024-01-02 | End: 2024-01-02

## 2024-01-02 RX ORDER — ONDANSETRON 4 MG/1
4 TABLET, ORALLY DISINTEGRATING ORAL ONCE
Status: COMPLETED | OUTPATIENT
Start: 2024-01-02 | End: 2024-01-02

## 2024-01-02 RX ORDER — PROMETHAZINE HYDROCHLORIDE 25 MG/1
25 TABLET ORAL ONCE
Status: COMPLETED | OUTPATIENT
Start: 2024-01-02 | End: 2024-01-02

## 2024-01-02 RX ORDER — DIPHENHYDRAMINE HCL 25 MG
50 CAPSULE ORAL EVERY 6 HOURS PRN
Qty: 30 CAPSULE | Refills: 0 | Status: SHIPPED | OUTPATIENT
Start: 2024-01-02

## 2024-01-02 RX ORDER — PROMETHAZINE HYDROCHLORIDE 25 MG/1
25 TABLET ORAL EVERY 6 HOURS PRN
Qty: 30 TABLET | Refills: 0 | Status: SHIPPED | OUTPATIENT
Start: 2024-01-02

## 2024-01-02 RX ORDER — METHADONE HYDROCHLORIDE 40 MG/1
47 TABLET ORAL
COMMUNITY

## 2024-01-02 RX ORDER — KETOROLAC TROMETHAMINE 30 MG/ML
30 INJECTION, SOLUTION INTRAMUSCULAR; INTRAVENOUS ONCE
Status: COMPLETED | OUTPATIENT
Start: 2024-01-02 | End: 2024-01-02

## 2024-01-02 RX ADMIN — KETOROLAC TROMETHAMINE 30 MG: 30 INJECTION, SOLUTION INTRAMUSCULAR; INTRAVENOUS at 11:25

## 2024-01-02 RX ADMIN — ONDANSETRON 4 MG: 4 TABLET, ORALLY DISINTEGRATING ORAL at 09:32

## 2024-01-02 RX ADMIN — PROMETHAZINE HYDROCHLORIDE 25 MG: 25 TABLET ORAL at 10:41

## 2024-01-02 RX ADMIN — DIPHENHYDRAMINE HYDROCHLORIDE 25 MG: 50 INJECTION, SOLUTION INTRAMUSCULAR; INTRAVENOUS at 11:24

## 2024-01-02 ASSESSMENT — PAIN DESCRIPTION - PAIN TYPE: TYPE: ACUTE PAIN

## 2024-01-02 NOTE — ED NOTES
Pt discharged to home. Pt provided education and discharge instructions per ERP. Pt wheeled out of ED with patients mom.

## 2024-01-02 NOTE — ED PROVIDER NOTES
ED Provider Note    CHIEF COMPLAINT  Chief Complaint   Patient presents with    Headache     X2 days    N/V    Cough     X2-3 days       HPI/ROS    OUTSIDE HISTORIAN(S):  Parent patient presents with her mom who also provides history    Nel Queen is a 48 y.o. female who presents with a headache.  The patient's been sick over the last couple of days with a frontal headache.  The patient's mom states she has had upper respiratory symptoms over the last 5 to 7 days with a cough and some congestion.  The patient is also had nausea and vomiting.  She is currently on methadone for chronic back pain.  She does have some photophobia.  She states she does not frequently get headaches.  She is unaware of any rashes.  She does not have a sore throat.  She states she has had sick contacts at home.    PAST MEDICAL HISTORY   has a past medical history of Abdominal pain (7/17/2012), Anxiety (2012), Back pain, GERD (gastroesophageal reflux disease) (7/17/2012), Gestational diabetes mellitus, and Psychiatric problem.    SURGICAL HISTORY   has a past surgical history that includes primary c section.    FAMILY HISTORY  Family History   Problem Relation Age of Onset    Other Sister         Crohn's disease    Diabetes Paternal Aunt        SOCIAL HISTORY  Social History     Tobacco Use    Smoking status: Never    Smokeless tobacco: Never   Substance and Sexual Activity    Alcohol use: Yes     Comment: rare    Drug use: No    Sexual activity: Yes       CURRENT MEDICATIONS  Home Medications       Reviewed by Jory Hernandez R.N. (Registered Nurse) on 01/02/24 at 0928  Med List Status: Partial     Medication Last Dose Status   alprazolam (XANAX) 0.5 MG Tab  Active   cefdinir (OMNICEF) 300 MG Cap  Active   meloxicam (MOBIC) 15 MG tablet  Active   methadone (DOLOPHINE) 40 MG TABLET SOLUBLE 1/1/2024 Active   metroNIDAZOLE (FLAGYL) 500 MG Tab  Active   ondansetron (ZOFRAN) 4 MG Tab tablet  Active   oxyCODONE CR (OXYCONTIN) 20 MG  "Tablet Extended Release 12 hour Abuse-Deterrent  Active   Oxycodone-Acetaminophen (PERCOCET-10)  MG TABS  Active                    ALLERGIES  No Known Allergies    PHYSICAL EXAM  VITAL SIGNS: /72   Pulse 70   Temp 36.6 °C (97.8 °F) (Temporal)   Resp 16   Ht 1.549 m (5' 1\")   Wt 92.4 kg (203 lb 11.3 oz)   SpO2 91%   BMI 38.49 kg/m²    In general the patient appears uncomfortable but nontoxic    Ears tympanic membranes retracted bilaterally, nares have swollen turbinates, oropharynx nonerythematous without exudates    Neck is supple without adenopathy    Pulmonary the patient's lungs are clear to auscultation bilaterally    Cardiovascular S1-S2 with a regular rate and rhythm    GI abdomen soft    Neurologic examination is grossly intact    DIAGNOSTIC STUDIES   Results for orders placed or performed during the hospital encounter of 01/02/24   CBC WITH DIFFERENTIAL   Result Value Ref Range    WBC 4.3 (L) 4.8 - 10.8 K/uL    RBC 5.03 4.20 - 5.40 M/uL    Hemoglobin 14.8 12.0 - 16.0 g/dL    Hematocrit 45.6 37.0 - 47.0 %    MCV 90.7 81.4 - 97.8 fL    MCH 29.4 27.0 - 33.0 pg    MCHC 32.5 32.2 - 35.5 g/dL    RDW 41.9 35.9 - 50.0 fL    Platelet Count 235 164 - 446 K/uL    MPV 9.9 9.0 - 12.9 fL    Neutrophils-Polys 69.70 44.00 - 72.00 %    Lymphocytes 24.00 22.00 - 41.00 %    Monocytes 5.60 0.00 - 13.40 %    Eosinophils 0.00 0.00 - 6.90 %    Basophils 0.20 0.00 - 1.80 %    Immature Granulocytes 0.50 0.00 - 0.90 %    Nucleated RBC 0.00 0.00 - 0.20 /100 WBC    Neutrophils (Absolute) 3.00 1.82 - 7.42 K/uL    Lymphs (Absolute) 1.03 1.00 - 4.80 K/uL    Monos (Absolute) 0.24 0.00 - 0.85 K/uL    Eos (Absolute) 0.00 0.00 - 0.51 K/uL    Baso (Absolute) 0.01 0.00 - 0.12 K/uL    Immature Granulocytes (abs) 0.02 0.00 - 0.11 K/uL    NRBC (Absolute) 0.00 K/uL   Comp Metabolic Panel   Result Value Ref Range    Sodium 137 135 - 145 mmol/L    Potassium 4.0 3.6 - 5.5 mmol/L    Chloride 99 96 - 112 mmol/L    Co2 27 20 - 33 " mmol/L    Anion Gap 11.0 7.0 - 16.0    Glucose 101 (H) 65 - 99 mg/dL    Bun 13 8 - 22 mg/dL    Creatinine 0.65 0.50 - 1.40 mg/dL    Calcium 9.2 8.5 - 10.5 mg/dL    Correct Calcium 9.0 8.5 - 10.5 mg/dL    AST(SGOT) 53 (H) 12 - 45 U/L    ALT(SGPT) 61 (H) 2 - 50 U/L    Alkaline Phosphatase 225 (H) 30 - 99 U/L    Total Bilirubin 0.5 0.1 - 1.5 mg/dL    Albumin 4.2 3.2 - 4.9 g/dL    Total Protein 8.8 (H) 6.0 - 8.2 g/dL    Globulin 4.6 (H) 1.9 - 3.5 g/dL    A-G Ratio 0.9 g/dL   TSH   Result Value Ref Range    TSH 1.290 0.380 - 5.330 uIU/mL   ESTIMATED GFR   Result Value Ref Range    GFR (CKD-EPI) 110 >60 mL/min/1.73 m 2       COURSE & MEDICAL DECISION MAKING    This a 48-year-old female who presents to the emerged part with a headache.  She does have evidence of a viral process with her cough as well as nausea and vomiting.  She also is retraction of the tympanic membranes and swollen turbinates also to support a viral source.  Laboratory analysis shows a slight leukopenia which again most likely is virally induced.  Family was requesting thyroid studies which was performed and is normal.  Complete metabolic panel shows a slight transaminitis and elevation in the alkaline phosphatase and family is aware this needs to be followed up on an outpatient basis.  The patient was treated effectively with IV Benadryl and Toradol.  She also tolerated oral Phenergan.  The patient be discharged home on Phenergan and Benadryl.  Otherwise she will maintain her current chronic pain regimen.  If she is not significantly better in 48 to 72 hours I like her to recheck.    FINAL DIAGNOSIS  1.  Headache  2.  Nausea and vomiting  3.  Suspect viral illness    Disposition  The patient will be discharged in stable condition       Electronically signed by: Deo Lopez M.D., 1/2/2024 10:31 AM

## 2024-01-02 NOTE — ED TRIAGE NOTES
Wheeled to triage  Chief Complaint   Patient presents with    Headache     X2 days    N/V    Cough     X2-3 days     Last vomited last night, but feels like she's going to vomit again, medicated in triage with ODT zofran.

## 2024-01-03 NOTE — DISCHARGE PLANNING
Call from pt's mother requesting antibiotics. CM asked to speak with daughter for permission to speak with Sakina.    Giuseppe reviewed and MD suspects viral illness and did not prescribe abx, Sakina says her daughter still has HA and thinks it's related to her wisdom tooth, which she did not mention to Dr Lopez.    Case discussed with Dr Liu who states pt does not need abx due to the viral nature.    VIVIAN updated Sakina.